# Patient Record
Sex: FEMALE | Race: WHITE | NOT HISPANIC OR LATINO | Employment: FULL TIME | URBAN - METROPOLITAN AREA
[De-identification: names, ages, dates, MRNs, and addresses within clinical notes are randomized per-mention and may not be internally consistent; named-entity substitution may affect disease eponyms.]

---

## 2018-01-10 NOTE — MISCELLANEOUS
Message   Date: 07 Mar 2016 10:01 AM EST, Recorded By: Dorma Epley For: Mack Manning: Lucy Loges, Self   Phone: [phone] (Home), [phone] (Work)   Reason: Medical Complaint   pt is having irregular bleeding    pt hasn't been seen since 2010  Carmina Morgan pt will schedule appt           Current Meds   1  Azithromycin 250 MG Oral Tablet; Therapy: 26VXO7224 to (Last SK:99KPJ4548)  Requested for: 55UCE1632 Ordered   2  Maxalt 10 MG Oral Tablet (Rizatriptan Benzoate); Therapy: 26QEC3255 to (Last Rx:08Fqp4634)  Requested for: 76MWM1027 Ordered   3  Pristiq 50 MG Oral Tablet Extended Release 24 Hour; Therapy: 42UXV2536 to (Last Rx:36Cgu3303)  Requested for: 34LGX0883 Ordered   4  TraZODone HCl - 50 MG Oral Tablet;    Therapy: 39IHJ8151 to (Last Rx:67Iuc4585)  Requested for: 89OEC2783 Ordered    Signatures   Electronically signed by : Tonna Ormond, ; Mar  7 2016 10:02AM EST                       (Author)

## 2018-01-12 NOTE — MISCELLANEOUS
Message   Recorded as Task   Date: 03/21/2016 09:13 AM, Created By: [place]   Task Name: Follow Up   Assigned To: Darcy Chaves   Regarding Patient: Néstor Lyons, Status: Active   Comment:    Darcy Chaves - 21 Mar 2016 9:13 AM     TASK CREATED  pt is calling for bx results  Shira Edmond - 21 Mar 2016 11:53 AM     TASK REPLIED TO: Previously Assigned To Pacheco Haddad  showing mild hyperplasia, we'll discuss further at ultrasound visit        Active Problems    1  Breast cancer screening (V76 10) (Z12 39)   2  History of self breast exam   3  Irregular bleeding (626 4) (N92 6)   4  Screening for HPV (human papillomavirus) (V73 81) (Z11 51)    Current Meds   1  Concerta 27 MG Oral Tablet Extended Release; Therapy: (Recorded:14Mar2016) to Recorded   2  Maxalt 10 MG Oral Tablet (Rizatriptan Benzoate); Therapy: 84VCY5618 to (Last Rx:01Puj3801)  Requested for: 79JYV9870 Ordered   3  Pristiq 50 MG Oral Tablet Extended Release 24 Hour; Therapy: 27PJW9947 to (Last Rx:97Bif2340)  Requested for: 39PAL6566 Ordered   4  TraZODone HCl - 50 MG Oral Tablet; Therapy: 69ESY7813 to (Last Rx:62Wuz4391)  Requested for: 35Nyt8518 Ordered    Allergies    1   Sulfa Drugs    Signatures   Electronically signed by : Samara Cade, ; Mar 21 2016 12:59PM EST                       (Author)

## 2020-12-20 ENCOUNTER — NURSE TRIAGE (OUTPATIENT)
Dept: OTHER | Facility: OTHER | Age: 55
End: 2020-12-20

## 2020-12-20 DIAGNOSIS — Z11.59 ENCOUNTER FOR SCREENING FOR OTHER VIRAL DISEASES: Primary | ICD-10-CM

## 2020-12-20 DIAGNOSIS — Z11.59 ENCOUNTER FOR SCREENING FOR OTHER VIRAL DISEASES: ICD-10-CM

## 2020-12-20 PROCEDURE — U0003 INFECTIOUS AGENT DETECTION BY NUCLEIC ACID (DNA OR RNA); SEVERE ACUTE RESPIRATORY SYNDROME CORONAVIRUS 2 (SARS-COV-2) (CORONAVIRUS DISEASE [COVID-19]), AMPLIFIED PROBE TECHNIQUE, MAKING USE OF HIGH THROUGHPUT TECHNOLOGIES AS DESCRIBED BY CMS-2020-01-R: HCPCS | Performed by: FAMILY MEDICINE

## 2020-12-22 LAB — SARS-COV-2 RNA SPEC QL NAA+PROBE: NOT DETECTED
